# Patient Record
Sex: MALE | Race: OTHER | HISPANIC OR LATINO | URBAN - METROPOLITAN AREA
[De-identification: names, ages, dates, MRNs, and addresses within clinical notes are randomized per-mention and may not be internally consistent; named-entity substitution may affect disease eponyms.]

---

## 2019-01-23 ENCOUNTER — EMERGENCY (EMERGENCY)
Facility: HOSPITAL | Age: 44
LOS: 1 days | Discharge: HOME | End: 2019-01-23
Admitting: EMERGENCY MEDICINE
Payer: COMMERCIAL

## 2019-01-23 VITALS
DIASTOLIC BLOOD PRESSURE: 80 MMHG | SYSTOLIC BLOOD PRESSURE: 133 MMHG | OXYGEN SATURATION: 95 % | WEIGHT: 279.99 LBS | TEMPERATURE: 98 F | RESPIRATION RATE: 18 BRPM | HEART RATE: 98 BPM

## 2019-01-23 PROCEDURE — 99283 EMERGENCY DEPT VISIT LOW MDM: CPT

## 2019-01-23 NOTE — ED ADULT NURSE NOTE - NSIMPLEMENTINTERV_GEN_ALL_ED
Implemented All Universal Safety Interventions:  [place] to call system. Call bell, personal items and telephone within reach. Instruct patient to call for assistance. Room bathroom lighting operational. Non-slip footwear when patient is off stretcher. Physically safe environment: no spills, clutter or unnecessary equipment. Stretcher in lowest position, wheels locked, appropriate side rails in place.

## 2019-01-23 NOTE — ED PROVIDER NOTE - CARE PROVIDER_API CALL
Espinoza Hernandez), Internal Medicine  [address] [place] [address] [id number]  Phone: [phone]  Fax: [phone]

## 2019-01-23 NOTE — ED ADULT NURSE NOTE - CHIEF COMPLAINT QUOTE
c/o b/l arm numbness/tingling x months and b/l upper leg numbness/tingling x 2 weeks. reports waves of pain to affected limbs. as per pt, went to [place], [address] [place] [address] for  New Years. denies CP, SOB, HA, N/V. pt was told he has HTN and was rx meds, does not take any meds at this time. EKG done by triage RN.

## 2019-01-23 NOTE — ED PROVIDER NOTE - NS ED ROS FT
Other than symptoms associated with present events the following is reported:  General:  No fever, no chills, no weight loss.  HEENT:  No sore throat.  Respiratory: No cough, no dyspnea, no wheeze.  Cardiovascular:  No chest pain, no palpitations, no orthopnea.  GI: No abdominal pain, no nausea/vomiting, no diarrhea.  : No dysuria, no frequency, no urgency.  Musculoskeletal:  No joint pain, no myalgia.  Endocrine:  No generalized weakness, no polyuria.  Neurological:  No headache, no focal weakness. +numbness to anterior thighs and bilateral digits.  Psychiatric: No emotional stress, no depression.  Derm:  No rash. No discoloration of fingers.   Heme:  No bruising, no bleeding.

## 2019-01-23 NOTE — ED PROVIDER NOTE - PHYSICAL EXAMINATION
VITAL SIGNS: I have reviewed nursing notes and confirm.  CONSTITUTIONAL: Well-developed; well-nourished; in no acute distress.  SKIN: Skin is warm and dry, no acute rash.  HEAD: Normocephalic; atraumatic.  EYES: PERRL, EOM intact; conjunctiva and sclera clear.  ENT: No nasal discharge; airway clear.  NECK: Supple; non tender.  CARD: S1, S2 normal; no murmurs, gallops, or rubs. Regular rate and rhythm.  RESP: No wheezes, rales or rhonchi.  ABD: Normal bowel sounds; soft; non-distended; non-tender; no hepatosplenomegaly.  EXT: Normal ROM. No clubbing, cyanosis or edema.  NEURO: Alert, oriented. Grossly unremarkable. Pt speaking in clear full sentences.   PSYCH: Cooperative, appropriate. VITAL SIGNS: I have reviewed nursing notes and confirm.  CONSTITUTIONAL: Well-developed; well-nourished; in no acute distress.  SKIN: Skin is warm and dry, no acute rash.  HEAD: Normocephalic; atraumatic.  EYES: PERRL, EOM intact; conjunctiva and sclera clear.  ENT: No nasal discharge; airway clear.  NECK: Supple; non tender.  CARD: S1, S2 normal; no murmurs, gallops, or rubs. Regular rate and rhythm.  RESP: No wheezes, rales or rhonchi.  ABD: Normal bowel sounds; soft; non-distended; non-tender; no hepatosplenomegaly.  EXT: Normal ROM. No clubbing, cyanosis or edema. sensation to bilateral upper extremities and bilateral lower extremities equal sensation to sharp and light touch in all 3 quadrants.  R foot: no sign of puncture or foreign body- non-tender. no erythema, edema, warmth, fluctuance, discharge   NEURO: Alert, oriented. Grossly unremarkable. Pt speaking in clear full sentences. CNs intact. Normal Romberg. Normal finger to nose. No pronator drift. Normal rapid alternating movements/heal to shin. Sensation intact to all extremities. 5/5 strength to all extremities. DTRs intact  PSYCH: Cooperative, appropriate.

## 2019-01-23 NOTE — ED PROVIDER NOTE - MEDICAL DECISION MAKING DETAILS
Pt presents with bilateral numbness to anterior thighs and digits. Will give tetanus shot as requested. will refer to outpatient follow up for suspected Thyamine and B-12 deficiencies as these tests are not available in ED. Pt presents with bilateral intermittent paresthesias to anterior thighs and hands intermittently. Will get fingerstick (no hx diabetes), does not take any medications. Denies any neck/back pain or injury. Will give tetanus shot as requested. will refer to outpatient follow up for suspected potential Thiamine, folate and B-6 deficiencies 2/2 to heavy drinking.

## 2019-01-23 NOTE — ED PROVIDER NOTE - NSFOLLOWUPINSTRUCTIONS_ED_ALL_ED_FT
Follow up with Dr. Hernandez for workup of folate, Vitamin B-6 and thiamine deficiencies secondary to chronic heavy alcohol drinking. Take Thiamine, folate and B-6  supplements until that time. Cut back in alcohol consumption.

## 2019-01-23 NOTE — ED ADULT TRIAGE NOTE - CHIEF COMPLAINT QUOTE
c/o b/l arm numbness/tingling x months and b/l upper leg numbness/tingling x 2 weeks. as per pt, went to [place], [address] [place] [address] for  New Years. denies CP, SOB, HA, N/V. pt was told he has HTN and was rx meds, does not take any meds at this time. EKG done by triage RN. c/o b/l arm numbness/tingling x months and b/l upper leg numbness/tingling x 2 weeks. reports waves of pain to affected limbs. as per pt, went to [place] [facility], [address] [place] [address] for  New Years. denies CP, SOB, HA, N/V. pt was told he has HTN and was rx meds, does not take any meds at this time. EKG done by triage RN.

## 2019-01-23 NOTE — ED ADULT NURSE NOTE - CHPI ED NUR SYMPTOMS NEG
no tingling/no decreased eating/drinking/no chills/no dizziness/no vomiting/no weakness/no nausea/no fever/no pain

## 2019-01-23 NOTE — ED PROVIDER NOTE - CHIEF COMPLAINT
The patient is a 43y Male complaining of see chief complaint quote. The patient is a 43y Male complaining of paresthesias

## 2019-01-27 DIAGNOSIS — R20.2 PARESTHESIA OF SKIN: ICD-10-CM

## 2019-01-27 DIAGNOSIS — I10 ESSENTIAL (PRIMARY) HYPERTENSION: ICD-10-CM

## 2019-01-27 DIAGNOSIS — R20.0 ANESTHESIA OF SKIN: ICD-10-CM

## 2019-01-27 DIAGNOSIS — F17.210 NICOTINE DEPENDENCE, CIGARETTES, UNCOMPLICATED: ICD-10-CM
